# Patient Record
Sex: FEMALE | Race: WHITE | HISPANIC OR LATINO | Employment: FULL TIME | URBAN - METROPOLITAN AREA
[De-identification: names, ages, dates, MRNs, and addresses within clinical notes are randomized per-mention and may not be internally consistent; named-entity substitution may affect disease eponyms.]

---

## 2022-12-03 ENCOUNTER — HOSPITAL ENCOUNTER (EMERGENCY)
Facility: CLINIC | Age: 32
Discharge: HOME | End: 2022-12-03
Attending: EMERGENCY MEDICINE | Admitting: EMERGENCY MEDICINE
Payer: OTHER MISCELLANEOUS

## 2022-12-03 ENCOUNTER — APPOINTMENT (OUTPATIENT)
Dept: MRI IMAGING | Facility: CLINIC | Age: 32
End: 2022-12-03
Attending: EMERGENCY MEDICINE
Payer: OTHER MISCELLANEOUS

## 2022-12-03 ENCOUNTER — APPOINTMENT (OUTPATIENT)
Dept: ULTRASOUND IMAGING | Facility: CLINIC | Age: 32
End: 2022-12-03
Attending: EMERGENCY MEDICINE
Payer: OTHER MISCELLANEOUS

## 2022-12-03 VITALS
WEIGHT: 210 LBS | TEMPERATURE: 98.7 F | DIASTOLIC BLOOD PRESSURE: 88 MMHG | HEIGHT: 65 IN | RESPIRATION RATE: 20 BRPM | HEART RATE: 84 BPM | OXYGEN SATURATION: 100 % | BODY MASS INDEX: 34.99 KG/M2 | SYSTOLIC BLOOD PRESSURE: 132 MMHG

## 2022-12-03 DIAGNOSIS — M51.16 LUMBAR DISC HERNIATION WITH RADICULOPATHY: ICD-10-CM

## 2022-12-03 DIAGNOSIS — R60.0 BILATERAL LOWER EXTREMITY EDEMA: ICD-10-CM

## 2022-12-03 LAB
ANION GAP SERPL CALCULATED.3IONS-SCNC: 5 MMOL/L (ref 3–14)
BASOPHILS # BLD AUTO: 0 10E3/UL (ref 0–0.2)
BASOPHILS NFR BLD AUTO: 0 %
BUN SERPL-MCNC: 13 MG/DL (ref 7–30)
CALCIUM SERPL-MCNC: 9 MG/DL (ref 8.5–10.1)
CHLORIDE BLD-SCNC: 107 MMOL/L (ref 94–109)
CO2 SERPL-SCNC: 29 MMOL/L (ref 20–32)
CREAT SERPL-MCNC: 0.8 MG/DL (ref 0.52–1.04)
EOSINOPHIL # BLD AUTO: 0 10E3/UL (ref 0–0.7)
EOSINOPHIL NFR BLD AUTO: 0 %
ERYTHROCYTE [DISTWIDTH] IN BLOOD BY AUTOMATED COUNT: 13.1 % (ref 10–15)
GFR SERPL CREATININE-BSD FRML MDRD: >90 ML/MIN/1.73M2
GLUCOSE BLD-MCNC: 95 MG/DL (ref 70–99)
HCG UR QL: NEGATIVE
HCT VFR BLD AUTO: 39.6 % (ref 35–47)
HGB BLD-MCNC: 13.1 G/DL (ref 11.7–15.7)
HOLD SPECIMEN: NORMAL
HOLD SPECIMEN: NORMAL
IMM GRANULOCYTES # BLD: 0.1 10E3/UL
IMM GRANULOCYTES NFR BLD: 1 %
INTERNAL QC OK POCT: NORMAL
LYMPHOCYTES # BLD AUTO: 1.7 10E3/UL (ref 0.8–5.3)
LYMPHOCYTES NFR BLD AUTO: 20 %
MCH RBC QN AUTO: 29 PG (ref 26.5–33)
MCHC RBC AUTO-ENTMCNC: 33.1 G/DL (ref 31.5–36.5)
MCV RBC AUTO: 88 FL (ref 78–100)
MONOCYTES # BLD AUTO: 0.7 10E3/UL (ref 0–1.3)
MONOCYTES NFR BLD AUTO: 8 %
NEUTROPHILS # BLD AUTO: 6.2 10E3/UL (ref 1.6–8.3)
NEUTROPHILS NFR BLD AUTO: 71 %
NRBC # BLD AUTO: 0 10E3/UL
NRBC BLD AUTO-RTO: 0 /100
PLATELET # BLD AUTO: 337 10E3/UL (ref 150–450)
POCT KIT EXPIRATION DATE: NORMAL
POCT KIT LOT NUMBER: NORMAL
POTASSIUM BLD-SCNC: 4 MMOL/L (ref 3.4–5.3)
RBC # BLD AUTO: 4.51 10E6/UL (ref 3.8–5.2)
SODIUM SERPL-SCNC: 141 MMOL/L (ref 133–144)
WBC # BLD AUTO: 8.8 10E3/UL (ref 4–11)

## 2022-12-03 PROCEDURE — 36415 COLL VENOUS BLD VENIPUNCTURE: CPT | Performed by: EMERGENCY MEDICINE

## 2022-12-03 PROCEDURE — 99285 EMERGENCY DEPT VISIT HI MDM: CPT | Performed by: EMERGENCY MEDICINE

## 2022-12-03 PROCEDURE — 99285 EMERGENCY DEPT VISIT HI MDM: CPT | Mod: 25 | Performed by: EMERGENCY MEDICINE

## 2022-12-03 PROCEDURE — 72148 MRI LUMBAR SPINE W/O DYE: CPT

## 2022-12-03 PROCEDURE — 82310 ASSAY OF CALCIUM: CPT | Performed by: EMERGENCY MEDICINE

## 2022-12-03 PROCEDURE — 96376 TX/PRO/DX INJ SAME DRUG ADON: CPT | Performed by: EMERGENCY MEDICINE

## 2022-12-03 PROCEDURE — 250N000011 HC RX IP 250 OP 636: Performed by: EMERGENCY MEDICINE

## 2022-12-03 PROCEDURE — 96374 THER/PROPH/DIAG INJ IV PUSH: CPT | Performed by: EMERGENCY MEDICINE

## 2022-12-03 PROCEDURE — 93970 EXTREMITY STUDY: CPT

## 2022-12-03 PROCEDURE — 85004 AUTOMATED DIFF WBC COUNT: CPT | Performed by: EMERGENCY MEDICINE

## 2022-12-03 PROCEDURE — 81025 URINE PREGNANCY TEST: CPT | Performed by: EMERGENCY MEDICINE

## 2022-12-03 PROCEDURE — 250N000013 HC RX MED GY IP 250 OP 250 PS 637: Performed by: EMERGENCY MEDICINE

## 2022-12-03 PROCEDURE — 96375 TX/PRO/DX INJ NEW DRUG ADDON: CPT | Performed by: EMERGENCY MEDICINE

## 2022-12-03 RX ORDER — HYDROCODONE BITARTRATE AND ACETAMINOPHEN 5; 325 MG/1; MG/1
1 TABLET ORAL EVERY 6 HOURS PRN
COMMUNITY

## 2022-12-03 RX ORDER — LORATADINE 10 MG/1
10 TABLET ORAL
COMMUNITY
Start: 2022-08-31

## 2022-12-03 RX ORDER — BACLOFEN 10 MG/1
10 TABLET ORAL EVERY 6 HOURS PRN
COMMUNITY

## 2022-12-03 RX ORDER — FUROSEMIDE 20 MG
20 TABLET ORAL
COMMUNITY
Start: 2022-07-10

## 2022-12-03 RX ORDER — KETOROLAC TROMETHAMINE 30 MG/ML
30 INJECTION, SOLUTION INTRAMUSCULAR; INTRAVENOUS ONCE
Status: COMPLETED | OUTPATIENT
Start: 2022-12-03 | End: 2022-12-03

## 2022-12-03 RX ORDER — GABAPENTIN 300 MG/1
CAPSULE ORAL
Qty: 50 CAPSULE | Refills: 0 | Status: SHIPPED | OUTPATIENT
Start: 2022-12-03

## 2022-12-03 RX ORDER — OXYCODONE HYDROCHLORIDE 5 MG/1
5 TABLET ORAL EVERY 6 HOURS PRN
Qty: 20 TABLET | Refills: 0 | Status: SHIPPED | OUTPATIENT
Start: 2022-12-03

## 2022-12-03 RX ORDER — ONDANSETRON 4 MG/1
1 TABLET, ORALLY DISINTEGRATING ORAL DAILY PRN
COMMUNITY
Start: 2021-02-17

## 2022-12-03 RX ORDER — HYDROMORPHONE HYDROCHLORIDE 1 MG/ML
0.5 INJECTION, SOLUTION INTRAMUSCULAR; INTRAVENOUS; SUBCUTANEOUS ONCE
Status: COMPLETED | OUTPATIENT
Start: 2022-12-03 | End: 2022-12-03

## 2022-12-03 RX ORDER — BUSPIRONE HYDROCHLORIDE 10 MG/1
10 TABLET ORAL EVERY MORNING
COMMUNITY

## 2022-12-03 RX ORDER — LIDOCAINE 4 G/G
1 PATCH TOPICAL ONCE
Status: DISCONTINUED | OUTPATIENT
Start: 2022-12-03 | End: 2022-12-03 | Stop reason: HOSPADM

## 2022-12-03 RX ORDER — SPIRONOLACTONE 25 MG/1
25 TABLET ORAL
COMMUNITY
Start: 2022-07-10

## 2022-12-03 RX ORDER — HYDROMORPHONE HYDROCHLORIDE 1 MG/ML
0.5 INJECTION, SOLUTION INTRAMUSCULAR; INTRAVENOUS; SUBCUTANEOUS
Status: COMPLETED | OUTPATIENT
Start: 2022-12-03 | End: 2022-12-03

## 2022-12-03 RX ORDER — VENLAFAXINE HYDROCHLORIDE 150 MG/1
100 CAPSULE, EXTENDED RELEASE ORAL DAILY
COMMUNITY
Start: 2021-02-17

## 2022-12-03 RX ORDER — OXYCODONE HYDROCHLORIDE 5 MG/1
5 TABLET ORAL ONCE
Status: COMPLETED | OUTPATIENT
Start: 2022-12-03 | End: 2022-12-03

## 2022-12-03 RX ADMIN — LIDOCAINE 1 PATCH: 560 PATCH PERCUTANEOUS; TOPICAL; TRANSDERMAL at 18:19

## 2022-12-03 RX ADMIN — HYDROMORPHONE HYDROCHLORIDE 0.5 MG: 1 INJECTION, SOLUTION INTRAMUSCULAR; INTRAVENOUS; SUBCUTANEOUS at 16:40

## 2022-12-03 RX ADMIN — KETOROLAC TROMETHAMINE 30 MG: 30 INJECTION, SOLUTION INTRAMUSCULAR at 16:40

## 2022-12-03 RX ADMIN — HYDROMORPHONE HYDROCHLORIDE 0.5 MG: 1 INJECTION, SOLUTION INTRAMUSCULAR; INTRAVENOUS; SUBCUTANEOUS at 17:33

## 2022-12-03 RX ADMIN — OXYCODONE HYDROCHLORIDE 5 MG: 5 TABLET ORAL at 20:36

## 2022-12-03 ASSESSMENT — ENCOUNTER SYMPTOMS
NUMBNESS: 1
BACK PAIN: 1
COLOR CHANGE: 0
ARTHRALGIAS: 0
DIARRHEA: 0
CHILLS: 0
COUGH: 0
SORE THROAT: 0
WEAKNESS: 1
ABDOMINAL PAIN: 0
DIFFICULTY URINATING: 0
VOMITING: 0
HEADACHES: 0
FEVER: 0
CONFUSION: 0
NAUSEA: 0
EYE REDNESS: 0
SHORTNESS OF BREATH: 1

## 2022-12-03 ASSESSMENT — ACTIVITIES OF DAILY LIVING (ADL)
ADLS_ACUITY_SCORE: 35
ADLS_ACUITY_SCORE: 35

## 2022-12-03 NOTE — ED TRIAGE NOTES
Pt reports severe back pain, reports she just got off a flight from [place]. Pt was seen at hospital 3 times in [place]. Pt's initial injury happened at work in July. Pt reports on 11/25 she re-injured her back while lifting. Pt had MRI in September that showed a bulged and herniated disc, she had a CT in [place] that she has a CD with her of this scan. Pt reports pain radiates down her L leg.    Pt is from [place], was going to drive home after landing but the pain was too severe so they stopped at the first ER they found.

## 2022-12-03 NOTE — ED PROVIDER NOTES
[facility] [place] [place]03/22      ED PROVIDER NOTE   ED 18      History     Chief Complaint   Patient presents with     Back Pain     HPI  Caitlin Reilly is a 32 year old female who presents with back pain.  She reports that she initially injured her back in July while lifting a patient (patient works as a nurse).  She was seen in [place], where she lives, at that time.  She did physical therapy and was given prescriptions for baclofen.  She continues to take the baclofen.  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  On November 25, she was reaching for a bag in her vehicle while trying to close the car door.  She twisted her back and immediately felt severe pain in the left low back and started noticing pain radiating down her left leg to her heel.  The radiculopathy is a new symptom since this time.  The pain has gotten significantly worse, to the point where she has seen the emergency department in [place], [place] 3 times since then.  She was given prescriptions for Medrol Dosepak, as well as provided with analgesia in the emergency department.  She had a CT scan yesterday and was told that she was diagnosed with a bulging disc.  She does not have a printout of the read of the CT scan but she does have a CD.  The pain was significantly worsening to the point where she is noticing paresthesias of the entire leg and particularly the posterior knee feels very tight.  After her third ED visit in the past week yesterday, she was told to try to go home and seek medical care there where they have specialty services, as [place], [place] does not have specialty services.  She got off an 11-hour flight from [place] today, and came here directly from the airport.  She is noticing very severe pain which is constant all the time and occasionally increases severely to the point where she has nausea and trouble breathing due to this.  She is  "also noticing back muscle spasms.    She had an MRI in September, and does have a picture on her phone of the read of the MRI from September 30 which she obtained when she was in [place].  The read of this MRI documented an L5-S1 bulging disc on top of a paracentral disc extrusion.    Patient denies any fevers or chills, no nasal congestion or sore throat.  No cough, no chest pain.  She does have shortness of breath associated with the pain.  She is nauseated but has not vomited.  Last bowel movement was 2 days ago.  She believes that she is becoming more constipated partially because she is in too much pain to sit on the toilet.  She has also been taking pain medications which have been prescribed as well as medicines that were given in the ED is in [place].  She is currently on Norco.  She has not taking any stool softeners.    Denies IV drug use.    Denies any urine or stool incontinence.  She does report it is becoming more difficult to pick her foot up off the floor.  She states this is both secondary to pain and weakness.    She has also noticed some increasing bilateral lower extremity edema.  She has lymphedema at baseline but believes that her edema has gotten worse, and particularly is feeling a tightness behind her left knee.    Other than the initial injury in July which occurred while lifting a patient, she denies any trauma or new injury.    Patient does report that while in [place], she likes to scuba dive.  She reports that she likes being in the water because \"it takes pressure off my back\" and she tends to have less pain when she is in the water.  These symptoms started on November 25.  On November 27, she was scuba diving but was having more difficulty maintaining buoyancy because she was having trouble kicking her left leg.  She also noted that she went through her tank of Nitrox more rapidly than she normally would (she states that normally 1 tank would last her 70 minutes and she was going through a " "tank in 45 minutes).  She dove to a depth of about 60 feet.  She does not recall any particular problems with the rapid ascent or decompression.       Past medical history  Hypertension  Bilateral lower extremity edema  ADHD  Anxiety  Depression          No past medical history on file.    No past surgical history on file.    No family history on file.    Social History     Tobacco Use     Smoking status: Not on file     Smokeless tobacco: Not on file   Substance Use Topics     Alcohol use: Not on file            Review of Systems   Constitutional: Negative for chills and fever.   HENT: Negative for congestion and sore throat.    Eyes: Negative for redness.   Respiratory: Positive for shortness of breath. Negative for cough.    Cardiovascular: Negative for chest pain.   Gastrointestinal: Negative for abdominal pain, diarrhea, nausea and vomiting.   Genitourinary: Negative for difficulty urinating.   Musculoskeletal: Positive for back pain. Negative for arthralgias.   Skin: Negative for color change.   Neurological: Positive for weakness and numbness. Negative for headaches.   Psychiatric/Behavioral: Negative for confusion.   All other systems reviewed and are negative.    A complete review of systems was performed with pertinent positives and negatives noted in the HPI, and all other systems negative.    Physical Exam   BP: (!) 140/96  Pulse: 91  Temp: 98.7  F (37.1  C)  Resp: 20  Height: 165.1 cm (5' 5\")  Weight: 95.3 kg (210 lb)  SpO2: 97 %  Physical Exam  Vitals and nursing note reviewed.   Constitutional:       General: She is in acute distress.      Appearance: She is not diaphoretic.      Comments: Adult female, lying on R side with hip and knee flexed, seems extremely distressed   HENT:      Head: Atraumatic.      Mouth/Throat:      Mouth: Mucous membranes are moist.      Pharynx: Oropharynx is clear. No oropharyngeal exudate.   Eyes:      General: No scleral icterus.     Extraocular Movements: EOM normal.    "   Pupils: Pupils are equal, round, and reactive to light.   Cardiovascular:      Rate and Rhythm: Normal rate and regular rhythm.      Pulses: Normal pulses.      Heart sounds: Normal heart sounds. No murmur heard.  Pulmonary:      Effort: No respiratory distress.      Breath sounds: Normal breath sounds.   Chest:      Chest wall: No tenderness.   Abdominal:      Palpations: Abdomen is soft.      Tenderness: There is no abdominal tenderness.      Comments: Soft, obese, nontender to palpation, hypoactive bowel sounds    Of note, palpation of the left side of the abdomen does not elicit any abdominal pain but does reproduce back pain   Musculoskeletal:         General: Tenderness present.      Cervical back: No tenderness.      Thoracic back: No tenderness.      Lumbar back: No tenderness.      Right lower leg: Edema present.      Left lower leg: Edema present.      Comments: TTP over low lumbar region and L sciatic notch   Skin:     General: Skin is warm.      Findings: No abrasion, laceration or rash.   Neurological:      Mental Status: She is alert.      Comments: Weakness with plantarflexion and dorsiflexion of L ankle    Normal strength R foot plantarflexion and dorsiflexion    Positive straight leg raise on the left and positive crossed straight leg raise on the right.     Sensation intact to light touch - no saddle anesthesia.           ED Course      Procedures       The medical record was reviewed and interpreted.  Current labs reviewed and interpreted.              Results for orders placed or performed during the hospital encounter of 12/03/22   US Lower Extremity Venous Duplex Bilateral     Status: None    Narrative    EXAM: US LOWER EXTREMITY VENOUS DUPLEX BILATERAL  LOCATION: [facility]  DATE/TIME: 12/3/2022 8:15 PM    INDICATION: BLE swelling, recent 12 hour plane trip, eval for DVT  COMPARISON: None.  TECHNIQUE: Venous Duplex ultrasound of bilateral lower  extremities with and without compression, augmentation and duplex. Color flow and spectral Doppler with waveform analysis performed.    FINDINGS: Exam includes the common femoral, femoral, popliteal veins as well as segmentally visualized deep calf veins and greater saphenous vein.     RIGHT: No deep vein thrombosis. No superficial thrombophlebitis. No popliteal cyst.    LEFT: No deep vein thrombosis. No superficial thrombophlebitis. No popliteal cyst.      Impression    IMPRESSION:  1.  No deep venous thrombosis in the bilateral lower extremities.   Lumbar spine MRI w/o contrast     Status: None    Narrative    EXAM: MR LUMBAR SPINE W/O CONTRAST  LOCATION: [facility]  DATE/TIME: 12/3/2022 7:43 PM    INDICATION: Low back pain; Neurologic deficit, non traumatic; LE weakness; Increasing persistent or sudden onset LE weakness; No known automatically detected potential contraindications to imaging  COMPARISON: None.  TECHNIQUE: Routine Lumbar Spine MRI without IV contrast.    FINDINGS:   Nomenclature based on 5 lumbar type vertebral bodies.   Normal coronal and sagittal alignment.   Normal vertebral body heights. Minor Schmorl's nodes scattered at multiple levels.  Normal marrow signal.  Normal distal spinal cord and cauda equina with conus medullaris at mid L2.   No extra spinal abnormalities. Unremarkable visualized bony pelvis.    T12-L1: Normal height of disc. Normal disc signal without herniation. Normal facet joints. No recess stenosis. No central spinal stenosis, no right foramen stenosis, no left foramen stenosis.    L1-L2: Normal height of disc. Desiccated disc without herniation. Normal facet joints. No recess stenosis. No central spinal stenosis, no right foramen stenosis, no left foramen stenosis.    L2-L3:  Slight loss of disc height. Desiccated disc without herniation. Normal facet joints. No recess stenosis. No central spinal stenosis, no right foramen  stenosis, no left foramen stenosis.    L3-L4: Normal height of disc. Mild disc desiccation without herniation. Normal facet joints. No recess stenosis. No central spinal stenosis, no right foramen stenosis, no left foramen stenosis.    L4-L5: Normal height of disc. Mild disc desiccation without herniation. Normal facet joints. No recess stenosis. No central spinal stenosis, no right foramen stenosis, no left foramen stenosis.    L5-S1: Slight loss of disc height. Desiccated disc with moderate left subarticular protrusion, impinging the left S1 nerve root. Normal facet joints. Moderate to severe left recess stenosis. Mild central spinal stenosis, no right foramen stenosis, no   left foramen stenosis.      Impression    IMPRESSION:  1.  At L5-S1 there is a moderate size left subarticular disc protrusion that impinges and posteriorly displaces the left S1 nerve root in the lateral recess.           Basic metabolic panel     Status: Normal   Result Value Ref Range    Sodium 141 133 - 144 mmol/L    Potassium 4.0 3.4 - 5.3 mmol/L    Chloride 107 94 - 109 mmol/L    Carbon Dioxide (CO2) 29 20 - 32 mmol/L    Anion Gap 5 3 - 14 mmol/L    Urea Nitrogen 13 7 - 30 mg/dL    Creatinine 0.80 0.52 - 1.04 mg/dL    Calcium 9.0 8.5 - 10.1 mg/dL    Glucose 95 70 - 99 mg/dL    GFR Estimate >90 >60 mL/min/1.73m2   CBC with platelets and differential     Status: None   Result Value Ref Range    WBC Count 8.8 4.0 - 11.0 10e3/uL    RBC Count 4.51 3.80 - 5.20 10e6/uL    Hemoglobin 13.1 11.7 - 15.7 g/dL    Hematocrit 39.6 35.0 - 47.0 %    MCV 88 78 - 100 fL    MCH 29.0 26.5 - 33.0 pg    MCHC 33.1 31.5 - 36.5 g/dL    RDW 13.1 10.0 - 15.0 %    Platelet Count 337 150 - 450 10e3/uL    % Neutrophils 71 %    % Lymphocytes 20 %    % Monocytes 8 %    % Eosinophils 0 %    % Basophils 0 %    % Immature Granulocytes 1 %    NRBCs per 100 WBC 0 <1 /100    Absolute Neutrophils 6.2 1.6 - 8.3 10e3/uL    Absolute Lymphocytes 1.7 0.8 - 5.3 10e3/uL    Absolute  Monocytes 0.7 0.0 - 1.3 10e3/uL    Absolute Eosinophils 0.0 0.0 - 0.7 10e3/uL    Absolute Basophils 0.0 0.0 - 0.2 10e3/uL    Absolute Immature Granulocytes 0.1 <=0.4 10e3/uL    Absolute NRBCs 0.0 10e3/uL   Extra Tube     Status: None    Narrative    The following orders were created for panel order Extra Tube.  Procedure                               Abnormality         Status                     ---------                               -----------         ------                     Extra Blue Top Tube[[id number]]                              Final result               Extra Red Top Tube[[id number]]                               Final result                 Please view results for these tests on the individual orders.   Extra Blue Top Tube     Status: None   Result Value Ref Range    Hold Specimen JIC    Extra Red Top Tube     Status: None   Result Value Ref Range    Hold Specimen JIC    hCG qual urine POCT     Status: Normal   Result Value Ref Range    HCG Qual Urine Negative Negative    Internal QC Check POCT Valid Valid    POCT Kit Lot Number [id number]     POCT Kit Expiration Date 3/31/24    CBC with Platelets & Differential     Status: None    Narrative    The following orders were created for panel order CBC with Platelets & Differential.  Procedure                               Abnormality         Status                     ---------                               -----------         ------                     CBC with platelets and d...[[id number]]                      Final result                 Please view results for these tests on the individual orders.     Medications   HYDROmorphone (PF) (DILAUDID) injection 0.5 mg (0.5 mg Intravenous Given 12/3/22 1640)   ketorolac (TORADOL) injection 30 mg (30 mg Intravenous Given 12/3/22 1640)   HYDROmorphone (PF) (DILAUDID) injection 0.5 mg (0.5 mg Intravenous Given 12/3/22 1733)   oxyCODONE (ROXICODONE) tablet 5 mg (5 mg Oral Given 12/3/22 2036)        Assessments & Plan  (with Medical Decision Making)   Patient presents to the emergency department for the above complaints.  She appears to be in acute distress, and is in quite a bit of pain.  Position of comfort appears to be lying on her right side with her left hip and knee flexed.  Any extension of her leg seems to reproduce the pain.  From a history standpoint, it certainly sounds like this is a radicular type problem, given the fact that she has known L5-S1 disc herniation from July, this certainly could be worsening of this chronic problem or acute exacerbation of this.    Rather doubt other differentials in the diagnosis, the patient does not have a history of drug use, is not febrile, I do not have reason to suspect epidural abscess.  However, she did mention that she does scuba dive.  She was scuba diving in [place] on November 27 to a depth of 60 feet and after this her back pain got quite a bit worse.  There is a very unlikely possibility that she could have an arterial gas embolism/decompression illness.  Given the fact that it is now been a week, any gas that was there is probably absorbed at this time, however there certainly could have been a spinal cord infarct which could cause a fair amount of pain.  I did speak to the radiologist about this as I was going to obtain the lumbar MRI anyway given her symptoms.  He did think it was reasonable to evaluate for any sort of arterial gas embolism or the effect of this such as a spinal cord infarct.  L-spine MRI was obtained and this demonstrates an L5-S1 moderate sized left subarticular disc protrusion that impinges and posteriorly displaces the left S1 nerve root in the lateral recess.    We were able to obtain reasonable pain control with Dilaudid and Toradol and lidocaine patch here in the emergency department.  She was able to ambulate slowly after these interventions.    We did give her a physical copy of the MRI results as well as a disc with the MRI.  I have instructed  her to follow-up at home in [place] with a local neurosurgery clinic or orthopedic spine surgery clinic.  She will call on Monday.  I did give her a prescription for oxycodone as well as gabapentin.  Typical precautions were discussed.  She was advised not to drink or drive while taking this medications as they can be sedating.  She was also advised to start a stool softener.  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    Also, given her bilateral lower extremity edema, complaints of left knee pressure and pain, and recent 12-hour flight, we did obtain a bilateral lower extremity ultrasound which did not show any signs of DVT.    Patient was discharged in the care of her friend at this time.    I have reviewed the nursing notes. I have reviewed the findings, diagnosis, plan and need for follow up with the patient.    Discharge Medication List as of 12/3/2022  8:39 PM      START taking these medications    Details   gabapentin (NEURONTIN) 300 MG capsule Take one capsule by mouth per day for 3 days, then take one capsule by mouth twice daily for 3 days, then take one capsule by mouth three times daily, Disp-50 capsule, R-0, Local Print      oxyCODONE (ROXICODONE) 5 MG tablet Take 1 tablet (5 mg) by mouth every 6 hours as needed for pain, Disp-20 tablet, R-0, Local Print             Final diagnoses:   Lumbar disc herniation with radiculopathy   Bilateral lower extremity edema       --  Joseline Alfaro MD  [facility] EMERGENCY DEPARTMENT  12/3/2022     Joseline Alfaro MD  12/04/22 0022

## 2022-12-04 NOTE — DISCHARGE INSTRUCTIONS
You have been seen in the emergency department today for back pain with radiculopathy.  This means that there is evidence that the disc from the L5-S1 space is pressing on the nerve.  This is what is causing your pain.  We have given you a CD of your MRI.  When you get home to [place], definitely follow-up with the neurosurgery clinic or an orthopedic clinic that does spine surgery.  It is possible that you may ultimately require surgery to correct this problem.    You have been given a prescription for oxycodone and gabapentin.  Both of these prescriptions can cause sedation.  Do not drink alcohol or drive a vehicle while you are taking these medications.  These medications can be constipating, so you definitely should take a stool softener.    The gabapentin is a nerve medication.  It needs to be uptitrated slowly, so follow the instructions.  It also needs to be discontinued somewhat slowly, so do not abruptly stop taking it.  Follow-up with your clinic at home if you find that it is helpful and need a refill of this.    If you are noticing any problems with control of bowel or bladder, or if you are having trouble with weakness of the legs, go to an emergency department immediately.    Your ultrasound of your legs shows no sign of blood clots.